# Patient Record
Sex: FEMALE | Race: WHITE | Employment: FULL TIME | URBAN - METROPOLITAN AREA
[De-identification: names, ages, dates, MRNs, and addresses within clinical notes are randomized per-mention and may not be internally consistent; named-entity substitution may affect disease eponyms.]

---

## 2023-01-13 ENCOUNTER — OFFICE VISIT (OUTPATIENT)
Dept: PRIMARY CARE CLINIC | Age: 38
End: 2023-01-13

## 2023-01-13 VITALS
OXYGEN SATURATION: 98 % | WEIGHT: 124 LBS | DIASTOLIC BLOOD PRESSURE: 68 MMHG | SYSTOLIC BLOOD PRESSURE: 106 MMHG | HEART RATE: 87 BPM

## 2023-01-13 DIAGNOSIS — R53.83 OTHER FATIGUE: ICD-10-CM

## 2023-01-13 DIAGNOSIS — Z00.00 PREVENTATIVE HEALTH CARE: Primary | ICD-10-CM

## 2023-01-13 DIAGNOSIS — R39.15 URINARY URGENCY: ICD-10-CM

## 2023-01-13 LAB
A/G RATIO: 2 (ref 1.1–2.2)
ALBUMIN SERPL-MCNC: 4.5 G/DL (ref 3.4–5)
ALP BLD-CCNC: 65 U/L (ref 40–129)
ALT SERPL-CCNC: 10 U/L (ref 10–40)
ANION GAP SERPL CALCULATED.3IONS-SCNC: 9 MMOL/L (ref 3–16)
AST SERPL-CCNC: 15 U/L (ref 15–37)
BASOPHILS ABSOLUTE: 0.1 K/UL (ref 0–0.2)
BASOPHILS RELATIVE PERCENT: 1.4 %
BILIRUB SERPL-MCNC: 0.4 MG/DL (ref 0–1)
BUN BLDV-MCNC: 16 MG/DL (ref 7–20)
CALCIUM SERPL-MCNC: 9.1 MG/DL (ref 8.3–10.6)
CHLORIDE BLD-SCNC: 103 MMOL/L (ref 99–110)
CHOLESTEROL, TOTAL: 175 MG/DL (ref 0–199)
CO2: 26 MMOL/L (ref 21–32)
CREAT SERPL-MCNC: 0.8 MG/DL (ref 0.6–1.1)
EOSINOPHILS ABSOLUTE: 0 K/UL (ref 0–0.6)
EOSINOPHILS RELATIVE PERCENT: 1 %
GFR SERPL CREATININE-BSD FRML MDRD: >60 ML/MIN/{1.73_M2}
GLUCOSE BLD-MCNC: 66 MG/DL (ref 70–99)
HCT VFR BLD CALC: 40.6 % (ref 36–48)
HDLC SERPL-MCNC: 54 MG/DL (ref 40–60)
HEMOGLOBIN: 13.5 G/DL (ref 12–16)
HEPATITIS C ANTIBODY INTERPRETATION: NORMAL
IRON SATURATION: 71 % (ref 15–50)
IRON: 189 UG/DL (ref 37–145)
LDL CHOLESTEROL CALCULATED: 108 MG/DL
LYMPHOCYTES ABSOLUTE: 1.7 K/UL (ref 1–5.1)
LYMPHOCYTES RELATIVE PERCENT: 39.4 %
MCH RBC QN AUTO: 29.4 PG (ref 26–34)
MCHC RBC AUTO-ENTMCNC: 33.1 G/DL (ref 31–36)
MCV RBC AUTO: 88.8 FL (ref 80–100)
MONOCYTES ABSOLUTE: 0.4 K/UL (ref 0–1.3)
MONOCYTES RELATIVE PERCENT: 9.7 %
NEUTROPHILS ABSOLUTE: 2.1 K/UL (ref 1.7–7.7)
NEUTROPHILS RELATIVE PERCENT: 48.5 %
PDW BLD-RTO: 12.7 % (ref 12.4–15.4)
PLATELET # BLD: 248 K/UL (ref 135–450)
PMV BLD AUTO: 8.3 FL (ref 5–10.5)
POTASSIUM SERPL-SCNC: 5 MMOL/L (ref 3.5–5.1)
RBC # BLD: 4.58 M/UL (ref 4–5.2)
SODIUM BLD-SCNC: 138 MMOL/L (ref 136–145)
TOTAL IRON BINDING CAPACITY: 267 UG/DL (ref 260–445)
TOTAL PROTEIN: 6.7 G/DL (ref 6.4–8.2)
TRIGL SERPL-MCNC: 66 MG/DL (ref 0–150)
TSH REFLEX: 1.2 UIU/ML (ref 0.27–4.2)
VITAMIN D 25-HYDROXY: 39.8 NG/ML
VLDLC SERPL CALC-MCNC: 13 MG/DL
WBC # BLD: 4.4 K/UL (ref 4–11)

## 2023-01-13 RX ORDER — CETIRIZINE HYDROCHLORIDE 10 MG/1
10 TABLET ORAL DAILY
COMMUNITY

## 2023-01-13 ASSESSMENT — PATIENT HEALTH QUESTIONNAIRE - PHQ9
SUM OF ALL RESPONSES TO PHQ QUESTIONS 1-9: 0
SUM OF ALL RESPONSES TO PHQ QUESTIONS 1-9: 0
2. FEELING DOWN, DEPRESSED OR HOPELESS: 0
SUM OF ALL RESPONSES TO PHQ9 QUESTIONS 1 & 2: 0
1. LITTLE INTEREST OR PLEASURE IN DOING THINGS: 0
SUM OF ALL RESPONSES TO PHQ QUESTIONS 1-9: 0
SUM OF ALL RESPONSES TO PHQ QUESTIONS 1-9: 0

## 2023-01-13 ASSESSMENT — ENCOUNTER SYMPTOMS
DIARRHEA: 0
COUGH: 0
WHEEZING: 0
CONSTIPATION: 0
NAUSEA: 0
BLOOD IN STOOL: 0
VOMITING: 0
SHORTNESS OF BREATH: 0

## 2023-01-13 NOTE — PROGRESS NOTES
PROGRESS NOTE  Date of Service:  1/13/2023  Address: [address] [facility]   [address], [address] [place]  Dept: [phone]  Loc: [phone]    Subjective:      Patient ID: [id number]  Clement Little is a 40 y.o. female    HPI: patient is here to establish care, patient said she is originally from [facility]. Patient works for [employer]. Patient has a 13 month old. Patient said that she is tried a lot more, she said that she used to take really care of her self, she has at 16 month at home and working full time. Patient said her daughter will wake up but she does wake up a lot through. Patient said her periods are a little heavier, she is keeping track, she said a little closer as well. Patient does have constipation recently, she said the last few months. Patient said that she only gets about 20 ounces of water a day. Patient is eating out 2-3 days a week. Review of Systems   Constitutional:  Negative for chills, fatigue and fever. Respiratory:  Negative for cough, shortness of breath and wheezing. Cardiovascular:  Negative for chest pain, palpitations and leg swelling. Gastrointestinal:  Negative for blood in stool, constipation, diarrhea, nausea and vomiting. Psychiatric/Behavioral:  Negative for self-injury, sleep disturbance and suicidal ideas. The patient is not nervous/anxious. All other systems reviewed and are negative. Objective:   Physical Exam  Vitals reviewed. Constitutional:       Appearance: Normal appearance. Cardiovascular:      Rate and Rhythm: Normal rate and regular rhythm. Pulses: Normal pulses. Heart sounds: Normal heart sounds. Pulmonary:      Effort: Pulmonary effort is normal.      Breath sounds: Normal breath sounds. Skin:     Capillary Refill: Capillary refill takes less than 2 seconds. Neurological:      General: No focal deficit present.       Mental Status: She is alert and oriented to person, place, and time. Psychiatric:         Mood and Affect: Mood normal.         Behavior: Behavior normal.         Thought Content: Thought content normal.         Judgment: Judgment normal.       Plan:   1. Preventative health care patient is doing well overall she has been more fatigued recently but she is feels this could be related to her lifestyle changes since she has had her 12month-old. We will get blood work to rule out underlying causes of fatigue patient agrees. Encourage patient to get back to exercising and yoga and increasing her water intake patient agrees. -     TSH with Reflex  -     CBC with Auto Differential  -     Iron and TIBC  -     Comprehensive Metabolic Panel  -     Hepatitis C Antibody  -     Lipid Panel  -     Vitamin D 25 Hydroxy  2. 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 Encourage patient to make lifestyle changes to improve overall health. Gave patient information about balance café. -     TSH with Reflex  -     CBC with Auto Differential  3. Urinary urgency patient was seeing a pelvic physical therapist in [facility] will refer today. -     [facility]           Electronically signed by ERICA Estrada CNP on 1/13/23 at 9:56 AM EST     This dictation was generated by voice recognition computer software. Although all attempts are made to edit the dictation for accuracy, there may be errors in the transcription that were not intended.

## 2023-02-08 ENCOUNTER — HOSPITAL ENCOUNTER (OUTPATIENT)
Dept: PHYSICAL THERAPY | Age: 38
Setting detail: THERAPIES SERIES
Discharge: HOME | End: 2023-02-08
Payer: COMMERCIAL

## 2023-02-08 LAB — PAP SMEAR, EXTERNAL: NORMAL

## 2023-02-08 PROCEDURE — 97161 PT EVAL LOW COMPLEX 20 MIN: CPT

## 2023-02-08 PROCEDURE — 97530 THERAPEUTIC ACTIVITIES: CPT

## 2023-02-08 PROCEDURE — 97140 MANUAL THERAPY 1/> REGIONS: CPT

## 2023-02-08 PROCEDURE — 97110 THERAPEUTIC EXERCISES: CPT

## 2023-02-08 NOTE — FLOWSHEET NOTE
[facility] Outpatient Therapy  [address], [address], [address]  Phone: [phone]   Fax: [phone]    Physical Therapy Treatment Note/ Progress Report:     Date:  2023     Patient Name:  Artis Goodman    :  1985  MRN: [id number]    Medical Diagnosis Information:  Urinary urgency [R39.15]  Treatment Diagnosis Information:  Treatment Diagnosis: R39.15 Urinary urgency, N39.3 stress incontinence  Insurance/Certification information:  PT Insurance Information: [facility]  Physician Information:  Mary Meyer, *   Plan of care signed:    [] Yes  [x] No    Date of Patient follow up with Physician:      Progress Report: []  Yes  [x]  No   If Yes, Date Range for reporting period:  Beginnin23  Ending:      Progress report due (10 Rx/or 30 days whichever is less):      Recertification due (POC duration/ or 90 days whichever is less):      Visit # Insurance Allowable Auth Needed     []Yes   []No     RESTRICTIONS/PRECAUTIONS: [place]  Latex Allergy:  [x]NO      []YES  Preferred Language for Healthcare:   [x]English       []other:    Functional Scale: PFDI-20   Score: 34/300 (11%)  Date assessed: 23    Pain level:  0/10     SUBJECTIVE:  See eval    OBJECTIVE: See eval  Observation: scoliosis R hip high/R shoulder low  Test measurements:      Exercises/Interventions: Exercises in bold performed in department today. Items not bolded are carried forward from prior visits for continuity of the record.     Exercise/Equipment Resistance/Repetitions HEP Other comments     []    PERFECT/R OI and LA release manual []      []    PF short holds 6-8 reps x 10 []    \"The knack\" anticipatory PF contraction w/cough x3 []      []      []      []      []      []      []      []      []      []      []      []      []      []      Home Exercise Program:      Therapeutic Exercise: per list x 14 min  [x] (25147) Provided verbal/tactile cueing for activities related to strengthening, flexibility, endurance, ROM for improvements in LE, proximal hip, pelvic floor, and core control with self-care, mobility, lifting, ambulation. Per list x 15 min    NMR:  [] (66554) Provided verbal/tactile cueing for activities related to improving balance, coordination, kinesthetic sense, posture, motor skill, proprioception to assist with LE, proximal hip, pelvic floor, and core control in self-care, mobility, lifting, ambulation and eccentric single leg control. Therapeutic Activities:  x 21 min  [x] (23130) Provided verbal/tactile cueing for activities related to improving balance, coordination, kinesthetic sense, posture, motor skill, proprioception and motor activation to allow for proper function of core, proximal hip, pelvic floor, and LE with self-care and ADLs and functional mobility. Other Treatment:   TA:  Bladder re-training/education:    Bladder Diary: patient educated on importance of filling out bladder diary at home, complete with fluid intake, voids, and leakage when applicable. Voiding: patient educated on normal voiding and urinary cycle and the physiology of bladder control muscles and pelvic floor. The patient was educated on bladder dysfunction as well as MILADY with urethral involvement. The patient was also educated on prolapse and it's affect on urination and pain. Dietary: patient educated on the \"4Cs\" to reduce bladder irritation and leakage. Other: education on fluid intake, high fiber foods and recommendations, squatty potty and avoidance of JIC voids and effects on bladder function.   Gait Training:    [] (21083) Provided training and instruction to the patient for proper LE, core and proximal hip recruitment and positioning and eccentric body weight control with ambulation re-education including up and down stairs     Manual Treatments:    [x] (34996) Provided manual therapy to mobilize soft tissue/joints for the purpose of modulating pain, promoting relaxation,  increasing ROM, reducing/eliminating soft tissue swelling/inflammation/restriction, improving soft tissue extensibility and allowing for proper ROM for normal function with self-care, mobility, lifting and ambulation. TERRY, R OI and LA MFR x 18 min    Home Exercise Program:    [x] (77924) Reviewed/Progressed HEP activities related to strengthening, flexibility, endurance, ROM of core, proximal hip, pelvic floor, and LE for functional self-care, mobility, lifting and ambulation/stair navigation   [] (49690) Reviewed/Progressed HEP activities related to improving balance, coordination, kinesthetic sense, posture, motor skill, proprioception of core, proximal hip pelvic floor, and LE for self-care, mobility, lifting, and ambulation/stair navigation      Modalities:    [] Electric Stimulation:   [] Ultrasound:   [] Other:       Charges:  Timed Code Treatment Minutes: Eval x 20, MT 14, TA x 19, TE x 14   Total Treatment Minutes: 67 min      [x] EVAL (LOW) 14862 (typically 20 minutes face-to-face)  [] EVAL (MOD) 09558 (typically 30 minutes face-to-face)  [] EVAL (HIGH) 58782 (typically 45 minutes face-to-face)  [] RE-EVAL     [x] HW(26818) x 1      [] NMR (68337) x       [x] Manual (52479) x 1      [x] TA (39897) x  1     [] Gait Training [phone] x       [] ES(attended) (20815)  [] ES (un) (54154)   [] DRY NEEDLE 1 OR 2 MUSCLES  [] DRY NEEDLE 3+ MUSCLES  [] Mech Traction (32921)  [] Ultrasound (49218)  [] Other:      GOALS:  Patient stated goal: \"able to hold urine\" without leaking  [] Progressing: [] Met: [] Not Met: [] Adjusted        Therapist goals for Patient:   Short Term Goals: To be achieved in: 6 weeks  1. Pt will report improvement in urinary urgency by 75%   [] Progressing: [] Met: [] Not Met: [] Adjusted  2.  Patient will report decreased JIC voids by 80% and verbalize 4/4 urge suppression/bladder retraining techniques   [] Progressing: [] Met: [] Not Met: [] Adjusted     Long Term Goals: To be achieved in: 12 weeks  1. Disability index score of 5% or less for the PFDI-20 to assist with reaching prior level of function. [] Progressing: [] Met: [] Not Met: [] Adjusted  2. Patient will demonstrate good core stability and pelvic bracing with exertion to prevent daily urinary leakage   [] Progressing: [] Met: [] Not Met: [] Adjusted  3. Patient will increase PERF score to 4/10/10/10 to demonstrate increased strength and coordination over pelvic floor musculature. [] Progressing: [] Met: [] Not Met: [] Adjusted  4. Patient will report 1 week or greater without leakage to progress towards completing ADLs and recreational activities without leakage. [] Progressing: [] Met: [] Not Met: [] Adjusted  5. Patient will demonstrate good load transfer,breath control, and body mechanics with exertion/lifting to prevent leakage  [] Progressing: [] Met: [] Not Met: [] Adjusted  6. Pt will resume recreational activities without leakage    [] Progressing: [] Met: [] Not Met: [] Adjusted         ASSESSMENT:  44yo female pt referred to PFPT due to urinary urgency and stress incontinence. Pt presents with I,non antalgic gait. R hip is high and R shoulder is low in standing. Pt demonstrates limitations in lumbar side bending to the R 25%. L proximal hip muscular is weak and R is tight in comparison. Pt demonstrates decreased stabilization on the L LE with SLS. Mild diastasis is present and activation of TrA is fair. Rectus Abdominus and obliques are good. Internal assessment revealed decreased pelvic floor strength, endurance and overall coordination with PERF score 2/7/6/6. Pt also demonstrated palpable tenderness at R OI and LA, with no palpable contraction with L OI activation. This patient will benefit from continued skilled PT to restore daily activity and exertion without urinary leakage.          Treatment/Activity Tolerance:  [x] Patient tolerated treatment well [] Patient limited by oswaldo  [] Patient limited by pain  [] Patient limited by other medical complications  [] Other:     Overall Progression Towards Functional goals/ Treatment Progress Update:  [] Patient is progressing as expected towards functional goals listed. [] Progression is slowed due to complexities/Impairments listed. [] Progression has been slowed due to co-morbidities. [x] Plan just implemented, too soon to assess goals progression <30days   [] Goals require adjustment due to lack of progress  [] Patient is not progressing as expected and requires additional follow up with physician  [] Other    Prognosis for POC: [x] Good [] Fair  [] Poor    Patient requires continued skilled intervention: [x] Yes  [] No        PLAN: See eval  [] Continue per plan of care [] Alter current plan (see comments)  [x] Plan of care initiated [] Hold pending MD visit [] Discharge    Electronically signed by: Roland Lozano PT    Note: If patient does not return for scheduled/recommended follow up visits, this note will serve as a discharge from care along with the most recent update on progress.

## 2023-02-08 NOTE — PLAN OF CARE
[facility] Outpatient Therapy  [address] [place] [address], CL[address], [address]  Phone: [phone]   Fax: [phone]                                                       Physical Therapy Certification    Dear ERICA Naqvi CNP,    We had the pleasure of evaluating the following patient for physical therapy services at [address] [place] [address]. A summary of our findings can be found in the initial assessment below. This includes our plan of care. If you have any questions or concerns regarding these findings, please do not hesitate to contact me at the office phone number checked above. Thank you for the referral.       Physician Signature:_______________________________Date:__________________  By signing above (or electronic signature), therapist's plan is approved by physician      Patient: Lois Mills   : 1985   MRN: [id number]  Referring Physician: ERICA Naqvi CNP      Evaluation Date: 2023      Medical Diagnosis Information:  Diagnosis: R 39.15 Urinary Urgency   Treatment diagnosis:  Treatment Diagnosis: R39.15 Urinary urgency, N39.3 stress incontinence                                            Insurance information: PT Insurance Information: [facility]    Chaperone offered for pelvic examination? [] No    [x] Yes  Chaperone requested for examination:  [x] No    [] Yes   If yes, who was present:    Precautions/ Contra-indications: Universal    Latex Allergy:  [x]NO      []YES  Allergies: Patient has no known allergies. Preferred Language for Healthcare:   [x]English       []Other:    C-SSRS Triggered by Intake questionnaire (Past 2 wk assessment ):   [x] No, Questionnaire did not trigger screening.   [] Yes, Patient intake triggered C-SSRS Screening     [] Completed, no further action required.    [] Completed, PCP notified via Epic    Functional Outcome:   PFDI-20: 23: 34/300 (11%)       SUBJECTIVE: Patient reports requesting PFPT due to ongoing issues with urinary urgency and stress incontinence. Pt recently moved to [facility] from [facility] where she was receiving PFPT following the birth of her child 16 months ago due to a grade III tear during delivery. Pt reports high stress levels due to working FT and taking care of 13 month old. Reports hx of constipation which has been resolved with going off prenatal vitamins last month. Pt denies pain but c/o general weakness and fatigue with desire to get back to recreational activities without fear of leakage. Pt goal for therapy is to \"be able to hold urine with cough/sneezing and recreational activity\"    No history of sexual abuse or trauma. Urinary frequency? Daytime? Yes,depending on day. Reports 5 JIC voids and sometimes goes 5 hours b/n voids. Nighttime? 0-1  Bowel problems? Constipation resolved  Urinary or bowel leakage? Urine 0-1x/wk, quarter size unless sneezing attack occurs  Leakage frequency?weekly  Protection:    Type: NA   #changes/day:  Pregnancy:   # of pregnancies:1   # of deliveries:1   Episiotomy: grade 3 tear   Normal post- healing? yes  Are you having regular menstrual cycles? yes  Date of last pap smear? Up to date       4 week or greater of failed trial of PFPT program?   [x] No    [] Yes    PFPT program as defined by \"Completing 4 weeks of an ordered plan of pelvic muscle exercises designed to increase periurethral muscle strength\".     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[employer], travels in car a lot      Living Status/Prior Level of Function: Prior to this giving birth 16 mo ago, pt was I with exertion,coughing,sneezing without incontinence    OBJECTIVE:    Observation:   Posture: R hip high, R shoulder low   Gait analysis: WFL  [facility]: [facility]  Scar Location/Mobility:  Diastasis Recti (in finger width): IGFEX:2:3  At umbilicus:1:2 PMAXA:7:3  Abdominal strength: TrA: underactive, RF: good, Obliques: good      Special Tests:  Sacroiliac Test:   Gillet: positive (R)  Gaenslen: negative    OSIEL: (-)   Thigh Thrust (-)   ASIS Distraction(-)   Sacral Compression (-)  Lumbar Spine:   Slump test: negative (B)   SLR : (-) B              ROM: RSB limited 25%  Hips: ASSLR: (+) decreased load transfer             ROM:WNL throughout             Strength: L flex: 4-, ext 4-, abd 4-, ER 3+, IR 3+  SLS: 20 sec B (difficulty on L)    Neuro:   Sensation: (B) UEs:intact to light touch   Sensation: (B) LEs: intact to light touch   Anal Sensation:    S5: intact to light touch    S4: intact to light touch    S3:intact to light touch   Reflexes:     Anal: present    Cough: present      Pelvic Floor Exam  External:  Skin Condition: normal  Sensation: intact   Palpation: no point tenderness noted  Tone: normal    Perineal Body Mobility:    Voluntary PFM Contraction: present (normal)   Voluntary PFM Relaxation: present ( delayed)   Involuntary PFM Contraction/Cough Reflex: present (bulge)   Involuntary PFM Relaxation: present (normal)  Perineal Body Descent:   Rest:  supported   Bearing down: present (caudal)    Q-tip test: NT   (R) UQ   (L) UQ   (R) LQ   (L) LQ   Post. Fourchette:    Internal:  Sensation: intact    Palpation: mild tenderness R OI and LA, L posterior fourchette   Vaginal Vault Size: decreased  PERFECT:   Power: 2/5 (good squeeze, no lift)   Endurance: 7 sec. Repetitions: 6   Fast Twitch: 6   Elevation:    Co-contraction: weak   Timing: decreased  OI strength: no palpable contraction on L  Pelvic Organ Prolapse:   Type: anterior   Grade: 2                         [x] Patient history, allergies, meds reviewed. Medical chart reviewed. See intake form.      Review Of Systems (ROS):  [x]Performed Review of systems (Integumentary, CardioPulmonary, Neurological) by intake and observation. Intake form has been scanned into medical record. Patient has been instructed to contact their primary care physician regarding ROS issues if not already being addressed at this time.       Co-morbidities/Complexities (which will affect course of rehabilitation):   []None        []Hx of COVID   Arthritic conditions   []Rheumatoid arthritis (M05.9)  []Osteoarthritis (M19.91)  []Gout   Cardiovascular conditions   []Hypertension (I10)  []Hyperlipidemia (E78.5)  []Angina pectoris (I20)  []Atherosclerosis (I70)  []Pacemaker  []Hx of CABG/stent/  cardiac surgeries   Musculoskeletal conditions   []Disc pathology   []Congenital spine pathologies   []Osteoporosis (M81.8)  []Osteopenia (M85.8)  [x]Scoliosis       Endocrine conditions   []Hypothyroid (E03.9)  []Hyperthyroid Gastrointestinal conditions   []Constipation (N08.29)   Metabolic conditions   []Morbid obesity (E66.01)  []Diabetes type 1(E10.65) or 2 (E11.65)   []Neuropathy (G60.9)     Cardio/Pulmonary conditions   []Asthma (J45)  []Coughing   []COPD (J44.9)  []CHF  []A-fib   Psychological Disorders  [x]Anxiety (F41.9)  []Depression (F32.9)   []Other:   Developmental Disorders  []Autism (F84.0)  []CP (G80)  []Down Syndrome (Q90.9)  []Developmental delay     Neurological conditions  []Prior Stroke (I69.30)  []Parkinson's (G20)  []Encephalopathy (G93.40)  []MS (G35)  []Post-polio (G14)  []SCI  []TBI  []ALS Other conditions  []Fibromyalgia (M79.7)  []Vertigo  []Syncope  []Kidney Failure  []Cancer      []currently undergoing                treatment  [x]Pregnancy  [x]Incontinence   Prior surgeries  []involved limb  []previous spinal surgery  [] section birth  []hysterectomy  []bowel / bladder surgery  []other relevant surgeries   []Other:              Barriers to/and or personal factors that will affect rehab potential:              []Age  []Sex   []Smoker              []Motivation/Lack of Motivation                        [x]Co-Morbidities []Cognitive Function, education/learning barriers              []Environmental, home barriers              []profession/work barriers  []past PT/medical experience  []other:      Falls Risk Assessment (30 days):   [x] Falls Risk assessed and no intervention required. [] Falls Risk assessed and Patient requires intervention due to being higher risk   TUG score (>12s at risk):     [] Falls education provided, including         ASSESSMENT: 44yo female pt referred to PFPT due to urinary urgency and stress incontinence. Pt presents with I,non antalgic gait. R hip is high and R shoulder is low in standing. Pt demonstrates limitations in lumbar side bending to the R 25%. L proximal hip muscular is weak and R is tight in comparison. Pt demonstrates decreased stabilization on the L LE with SLS. Mild diastasis is present and activation of TrA is fair. Rectus Abdominus and obliques are good. Internal assessment revealed decreased pelvic floor strength, endurance and overall coordination with PERF score 2/7/6/6. Pt also demonstrated palpable tenderness at R OI and LA, with no palpable contraction with L OI activation. This patient will benefit from continued skilled PT to restore daily activity and exertion without urinary leakage.     Functional Impairments:    []Noted lumbar/proximal hip hypomobility  []Noted lumbosacral and/or generalized hypermobility  [x]Decreased core/proximal hip strength and neuromuscular control   [x]Decreased LE functional strength  [x]pelvic/sacral/spinal malalignment   []Increased pain with penetration  [x]Absent/poor control of PF contraction   [x]Absent/poor control of PF relaxation  [x]Decreased control of bladder  []Decreased control of bowel    Functional Activity Limitations (from functional questionnaire and intake)  [x]Reduced ability to maintain good posture and demonstrate good body mechanics with sitting, bending, and lifting  []Reduced ability to perform lifting, reaching, carrying tasks  [x]Reduced ability to control urine  []Reduced ability to control bowel movements   []Reduced ability to ambulate prolonged functional periods/distances/surfaces  []Reduced ability to squat   []Reduced ability to forward bend  []Reduced ability to ascend/descend stairs  []Reduced ability to tolerate penetration/intercourse    Participation Restrictions  []Reduced participation in self care activities  []Reduced participation in home management activities  []Reduced participation in work activities  []Reduced participation in social activities  [x]Reduced participation in sport/recreational activities    Classification/Subgrouping:  []signs/symptoms consistent vaginismus/dyspareunia    []signs/symptoms consistent with pelvic floor organ prolapse  [x]signs/symptoms consistent with stress urinary incontinence  [x]signs/symptoms consistent with urge urinary incontinence  []signs/symptoms consistent with bowel incontinence  []signs/symptoms consistent with post-surgical status including decreased ROM, strength and function  []signs/symptoms consistent with other:       Prognosis/Rehab Potential:      []Excellent   [x]Good    []Fair   []Poor    Tolerance of evaluation/treatment:    []Excellent   [x]Good    []Fair   []Poor    Physical Therapy Evaluation Complexity Justification  [x] A history of present problem with:  [] no personal factors and/or comorbidities that impact the plan of care;  [x]1-2 personal factors and/or comorbidities that impact the plan of care  []3 personal factors and/or comorbidities that impact the plan of care  [x] An examination of body systems using standardized tests and measures addressing any of the following: body structures and functions (impairments), activity limitations, and/or participation restrictions;:  [x] a total of 1-2 or more elements   [] a total of 3 or more elements   [] a total of 4 or more elements   [x] A clinical presentation with:  [] stable and/or uncomplicated characteristics   [] evolving clinical presentation with changing characteristics  [] unstable and unpredictable characteristics;   [x] Clinical decision making of [x] low, [] moderate, [] high complexity using standardized patient assessment instrument and/or measurable assessment of functional outcome. [x] EVAL (LOW) 35549 (typically 20 minutes face-to-face)  [] EVAL (MOD) 35874 (typically 30 minutes face-to-face)  [] EVAL (HIGH) 00445 (typically 45 minutes face-to-face)  [] RE-EVAL       PLAN:   Frequency/Duration:  1-2 days per week for 12 Weeks:  Interventions:  [x]  Therapeutic exercise including: strength training, ROM, and functional mobility for joint, spine, core, and pelvic floor   [x]  NMR activation and proprioception for abdominals, pelvic floor musculature activation and coordination, and posture retraining   [x]  Manual therapy as indicated for spine, ribs, soft tissue, and pelvic floor to include: IASTM with or without dilator, STM, PROM, Gr I-IV mobilizations   [x] Modalities as needed that may include: E-stim, Biofeedback as indicated  [x] Patient education on pelvic floor anatomy and function, bladder and bowel anatomy and function, joint protection, postural re-education, activity modification, progression of HEP. HEP instruction: Written HEP instructions provided and reviewed    GOALS:  Patient stated goal: \"able to hold urine\" without leaking  [] Progressing: [] Met: [] Not Met: [] Adjusted      Therapist goals for Patient:   Short Term Goals: To be achieved in: 6 weeks  1. Pt will report improvement in urinary urgency by 75%   [] Progressing: [] Met: [] Not Met: [] Adjusted  2. Patient will report decreased JIC voids by 80% and verbalize 4/4 urge suppression/bladder retraining techniques   [] Progressing: [] Met: [] Not Met: [] Adjusted    Long Term Goals: To be achieved in: 12 weeks  1.  Disability index score of 5% or less for the PFDI-20 to assist with reaching prior level of function. [] Progressing: [] Met: [] Not Met: [] Adjusted  2. Patient will demonstrate good core stability and pelvic bracing with exertion to prevent daily urinary leakage   [] Progressing: [] Met: [] Not Met: [] Adjusted  3. Patient will increase PERF score to 4/10/10/10 to demonstrate increased strength and coordination over pelvic floor musculature. [] Progressing: [] Met: [] Not Met: [] Adjusted  4. Patient will report 1 week or greater without leakage to progress towards completing ADLs and recreational activities without leakage. [] Progressing: [] Met: [] Not Met: [] Adjusted  5. Patient will demonstrate good load transfer,breath control, and body mechanics with exertion/lifting to prevent leakage  [] Progressing: [] Met: [] Not Met: [] Adjusted  6.  Pt will resume recreational activities without leakage    [] Progressing: [] Met: [] Not Met: [] Adjusted     Electronically signed by:  Bibiana Khan, PT

## 2023-02-20 ENCOUNTER — HOSPITAL ENCOUNTER (OUTPATIENT)
Dept: PHYSICAL THERAPY | Age: 38
Setting detail: THERAPIES SERIES
Discharge: HOME | End: 2023-02-20
Payer: COMMERCIAL

## 2023-02-20 PROCEDURE — 97530 THERAPEUTIC ACTIVITIES: CPT

## 2023-02-20 PROCEDURE — 97110 THERAPEUTIC EXERCISES: CPT

## 2023-02-20 NOTE — FLOWSHEET NOTE
[facility] Outpatient Therapy  [address] [place] [address] [address], [address]  Phone: [phone]   Fax: [phone]    Physical Therapy Treatment Note/ Progress Report:     Date:  2023     Patient Name:  Cheyanne Howe    :  1985  MRN: [id number]    Medical Diagnosis Information:  Urinary urgency [R39.15]  Treatment Diagnosis Information:  Treatment Diagnosis: R39.15 Urinary urgency, N39.3 stress incontinence  Insurance/Certification information:  PT Insurance Information: [facility]  Physician Information:  Barber Pablo, *   Plan of care signed:    [] Yes  [x] No    Date of Patient follow up with Physician:      Progress Report: []  Yes  [x]  No   If Yes, Date Range for reporting period:  Beginnin23  Ending:      Progress report due (10 Rx/or 30 days whichever is less): 72     Recertification due (POC duration/ or 90 days whichever is less):      Visit # Insurance Allowable Auth Needed     []Yes   []No     RESTRICTIONS/PRECAUTIONS: [place]  Latex Allergy:  [x]NO      []YES  Preferred Language for Healthcare:   [x]English       []other:    Functional Scale: PFDI-20   Score: 34/300 (11%)  Date assessed: 23    Pain level:  0/10     SUBJECTIVE: denies any leakage since last seen, had something stuck in through and trying to clear throat/coughing with no leakage, has been practicing the anticipatory PF contraction as instructed last visit    OBJECTIVE: See eval  Observation: scoliosis R hip high/R shoulder low  Test measurements:      Exercises/Interventions: Exercises in bold performed in department today. Items not bolded are carried forward from prior visits for continuity of the record.     Exercise/Equipment Resistance/Repetitions HEP Other comments     []     []    Bladder diary instruction []      []    \"The knack\" anticipatory PF contraction w/cough X 3 []      []    TrA + heel slide 3x10 []    Abdominal crunch 3x10 []    obliques 3x10 [] []    Clamshells + TrA Green tband x 30 []    SL abduction + TrA x30 []      []    PF short holds 6-8 sets x 10 []    PF  long holds  7 sec holds x 7 [] Cues for breathing   PF + add x10 []      []      []      Home Exercise Program:      Therapeutic Exercise:   [x] (97206) Provided verbal/tactile cueing for activities related to strengthening, flexibility, endurance, ROM for improvements in LE, proximal hip, pelvic floor, and core control with self-care, mobility, lifting, ambulation. Per list x 15 min    NMR:  [] (12663) Provided verbal/tactile cueing for activities related to improving balance, coordination, kinesthetic sense, posture, motor skill, proprioception to assist with LE, proximal hip, pelvic floor, and core control in self-care, mobility, lifting, ambulation and eccentric single leg control. Therapeutic Activities:    [] (43966) Provided verbal/tactile cueing for activities related to improving balance, coordination, kinesthetic sense, posture, motor skill, proprioception and motor activation to allow for proper function of core, proximal hip, pelvic floor, and LE with self-care and ADLs and functional mobility. Other Treatment:   TA:  Bladder re-training/education:    Bladder Diary: patient educated on importance of filling out bladder diary at home, complete with fluid intake, voids, and leakage when applicable. Voiding: patient educated on normal voiding and urinary cycle and the physiology of bladder control muscles and pelvic floor. The patient was educated on bladder dysfunction as well as MILADY with urethral involvement. The patient was also educated on prolapse and it's affect on urination and pain. Dietary: patient educated on the \"4Cs\" to reduce bladder irritation and leakage. Other: education on fluid intake, high fiber foods and recommendations, squatty potty and avoidance of JIC voids and effects on bladder function.     Gait Training:    [] (48285) Provided training and instruction to the patient for proper LE, core and proximal hip recruitment and positioning and eccentric body weight control with ambulation re-education including up and down stairs     Manual Treatments:    [] (29991) Provided manual therapy to mobilize soft tissue/joints for the purpose of modulating pain, promoting relaxation,  increasing ROM, reducing/eliminating soft tissue swelling/inflammation/restriction, improving soft tissue extensibility and allowing for proper ROM for normal function with self-care, mobility, lifting and ambulation. Home Exercise Program:    [] (93810) Reviewed/Progressed HEP activities related to strengthening, flexibility, endurance, ROM of core, proximal hip, pelvic floor, and LE for functional self-care, mobility, lifting and ambulation/stair navigation   [] (58838) Reviewed/Progressed HEP activities related to improving balance, coordination, kinesthetic sense, posture, motor skill, proprioception of core, proximal hip pelvic floor, and LE for self-care, mobility, lifting, and ambulation/stair navigation      Modalities:    [] Electric Stimulation:   [] Ultrasound:   [] Other:       Charges:  Timed Code Treatment Minutes: TE x 33, TA x 13   Total Treatment Minutes: 46 min      [] EVAL (LOW) 18148 (typically 20 minutes face-to-face)  [] EVAL (MOD) 70292 (typically 30 minutes face-to-face)  [] EVAL (HIGH) 59987 (typically 45 minutes face-to-face)  [] RE-EVAL     [x] KS(34405) x 2      [] NMR (68860) x       [] Manual (98887) x       [x] TA (80924) x  1     [] Gait Training [phone]) x       [] ES(attended) (83387)  [] ES (un) (96 [phone])   [] DRY NEEDLE 1 OR 2 MUSCLES  [] DRY NEEDLE 3+ MUSCLES  [] Mech Traction (80739)  [] Ultrasound (96558)  [] Other:      GOALS:  Patient stated goal: \"able to hold urine\" without leaking  [] Progressing: [] Met: [] Not Met: [] Adjusted        Therapist goals for Patient:   Short Term Goals: To be achieved in: 6 weeks  1.  Pt will report improvement in urinary urgency by 75%   [] Progressing: [] Met: [] Not Met: [] Adjusted  2. Patient will report decreased JIC voids by 80% and verbalize 4/4 urge suppression/bladder retraining techniques   [] Progressing: [] Met: [] Not Met: [] Adjusted     Long Term Goals: To be achieved in: 12 weeks  1. Disability index score of 5% or less for the PFDI-20 to assist with reaching prior level of function. [] Progressing: [] Met: [] Not Met: [] Adjusted  2. Patient will demonstrate good core stability and pelvic bracing with exertion to prevent daily urinary leakage   [] Progressing: [] Met: [] Not Met: [] Adjusted  3. Patient will increase PERF score to 4/10/10/10 to demonstrate increased strength and coordination over pelvic floor musculature. [] Progressing: [] Met: [] Not Met: [] Adjusted  4. Patient will report 1 week or greater without leakage to progress towards completing ADLs and recreational activities without leakage. [] Progressing: [] Met: [] Not Met: [] Adjusted  5. Patient will demonstrate good load transfer,breath control, and body mechanics with exertion/lifting to prevent leakage  [] Progressing: [] Met: [] Not Met: [] Adjusted  6. Pt will resume recreational activities without leakage    [] Progressing: [] Met: [] Not Met: [] Adjusted         ASSESSMENT:  44yo female pt referred to PFPT due to urinary urgency and stress incontinence. Pt tolerated initiation of core and PF strengthening well. Demonstrated difficulty holding PF x 7 seconds using breath holding with continued training required. Pt instructed in bladder diary for review next visit. Overall tolerating therapy well with improvement with leakage reported already. .This patient will benefit from continued skilled PT to restore daily activity and exertion without urinary leakage.          Treatment/Activity Tolerance:  [x] Patient tolerated treatment well [] Patient limited by fatique  [] Patient limited by pain  [] Patient limited by other medical complications  [] Other:     Overall Progression Towards Functional goals/ Treatment Progress Update:  [x] Patient is progressing as expected towards functional goals listed. [] Progression is slowed due to complexities/Impairments listed. [] Progression has been slowed due to co-morbidities. [] Plan just implemented, too soon to assess goals progression <30days   [] Goals require adjustment due to lack of progress  [] Patient is not progressing as expected and requires additional follow up with physician  [] Other    Prognosis for POC: [x] Good [] Fair  [] Poor    Patient requires continued skilled intervention: [x] Yes  [] No        PLAN: See eval  [] Continue per plan of care [] Alter current plan (see comments)  [x] Plan of care initiated [] Hold pending MD visit [] Discharge    Electronically signed by: Ely Foote PT    Note: If patient does not return for scheduled/recommended follow up visits, this note will serve as a discharge from care along with the most recent update on progress.

## 2023-03-01 ENCOUNTER — HOSPITAL ENCOUNTER (OUTPATIENT)
Dept: PHYSICAL THERAPY | Age: 38
Setting detail: THERAPIES SERIES
Discharge: HOME | End: 2023-03-01
Payer: COMMERCIAL

## 2023-03-01 PROCEDURE — 97140 MANUAL THERAPY 1/> REGIONS: CPT

## 2023-03-01 PROCEDURE — 97110 THERAPEUTIC EXERCISES: CPT

## 2023-03-01 PROCEDURE — 97530 THERAPEUTIC ACTIVITIES: CPT

## 2023-03-01 NOTE — FLOWSHEET NOTE
[facility] Outpatient Therapy  [facility] [address] [place] [address],  Damien Mercer 51, [address]  Phone: [phone]   Fax: [phone]    Physical Therapy Treatment Note/ Progress Report:     Date:  2023     Patient Name:  Anni Meade    :  1985  MRN: [id number]    Medical Diagnosis Information:  Urinary urgency [R39.15]  Treatment Diagnosis Information:  Treatment Diagnosis: R39.15 Urinary urgency, N39.3 stress incontinence  Insurance/Certification information:  PT Insurance Information: [facility]  Physician Information:  Anil Deal, *   Plan of care signed:    [x] Yes  [] No    Date of Patient follow up with Physician:      Progress Report: []  Yes  [x]  No   If Yes, Date Range for reporting period:  Beginnin23  Ending:      Progress report due (10 Rx/or 30 days whichever is less): 0/3/49     Recertification due (POC duration/ or 90 days whichever is less):      Visit # Insurance Allowable Auth Needed   3/24  []Yes   []No     RESTRICTIONS/PRECAUTIONS: [place]  Latex Allergy:  [x]NO      []YES  Preferred Language for Healthcare:   [x]English       []other:    Functional Scale: PFDI-20   Score: 34/300 (11%)  Date assessed: 23    Pain level:  2/10 low back     SUBJECTIVE: reports having the flu over the past few days with complete loss of urine when vomiting    OBJECTIVE: See eval  Observation: scoliosis :R hip high/R shoulder low  Test measurements:  RSB decreased 25%,  Mild palpable tenderness R OI and LA  L hip strength 4- except rotators 3+  No palpable contraction L OI  PERFECT: /6/6, decreased coordination,timing and minimal co-contraction of TrA    Exercises/Interventions: Exercises in bold performed in department today. Items not bolded are carried forward from prior visits for continuity of the record.     Exercise/Equipment Resistance/Repetitions HEP Other comments   ITB,piriformis,hamstring stretch R LE manual []    R long leg pulls 0/45, lateral hip distraction,B long leg distraction manual []    Bladder diary Reviewed results []      []    \"The knack\" anticipatory PF contraction w/cough X 3 []    Bridge + PF x10 []    TrA + heel slide 3x10 []    Abdominal crunch 3x10 []    obliques 3x10 []      []    Clamshells + TrA Green tband x 30 []    SL abduction + TrA x30 []      []    PF short holds 6-8 sets x 10 []    PF  long holds  7 sec holds x 7 [] Cues for breathing   PF + add x10 []    PF + abd Green tband x 10 []      []      Home Exercise Program:  Access Code: [email or web address]  URL: [email or web address]  Date: 03/01/2023  Prepared by: America Hinton    Exercises  Standing ITB Stretch - 1 x daily - 7 x weekly - 1 sets - 3 reps - 30 hold  Seated Pelvic Floor Contraction - 1 x daily - 7 x weekly - 6-8 sets - 10 reps - 1 hold  Seated Pelvic Floor Contraction - 1 x daily - 7 x weekly - 1 sets - 10 reps - 7 hold  Seated Pelvic Floor Contraction with Hip Abduction and Resistance Loop - 1 x daily - 7 x weekly - 3 sets - 10 reps  Seated Pelvic Floor Contraction with Isometric Hip Adduction - 1 x daily - 7 x weekly - 3 sets - 10 reps  Seated Cough with Pelvic Floor Contraction and Hand to Mouth - 1 x daily - 7 x weekly - 1 sets - 5 reps  Supine Bridge with Pelvic Floor Contraction - 1 x daily - 7 x weekly - 3 sets - 10 reps  Clamshell with Resistance - 1 x daily - 7 x weekly - 3 sets - 10 reps  Sidelying Hip Abduction - 1 x daily - 7 x weekly - 3 sets - 10 reps      Therapeutic Exercise:   [x] (86293) Provided verbal/tactile cueing for activities related to strengthening, flexibility, endurance, ROM for improvements in LE, proximal hip, pelvic floor, and core control with self-care, mobility, lifting, ambulation.  Per list x 17 min    NMR:  [] (72195) Provided verbal/tactile cueing for activities related to improving balance, coordination, kinesthetic sense, posture, motor skill, proprioception to assist with LE, proximal hip, pelvic floor, and core control in self-care, mobility, lifting, ambulation and eccentric single leg control. Therapeutic Activities:    [] (11890) Provided verbal/tactile cueing for activities related to improving balance, coordination, kinesthetic sense, posture, motor skill, proprioception and motor activation to allow for proper function of core, proximal hip, pelvic floor, and LE with self-care and ADLs and functional mobility. Other Treatment: x 15 min   TA:  Bladder re-training/education:    Bladder Diary: patient educated on importance of filling out bladder diary at home, complete with fluid intake, voids, and leakage when applicable. Voiding: patient voiding 7x/day, 2-4 hour duration, no leaks, \"normal\" urge, 1 JIC void, 1 night time void    Dietary: fluid intake 56oz- 90oz per day with half being water, low fiber intake with eduction provided on fiber,constipation, and relationship to PF and prolapse    Other: bowel noted to be 2-3 on [place] scale, instructed in behavioral strategies: quick flicks and perineal pressure/sitting to decrease urgency    Gait Training:    [] (89794) Provided training and instruction to the patient for proper LE, core and proximal hip recruitment and positioning and eccentric body weight control with ambulation re-education including up and down stairs     Manual Treatments:    [x] (91562) Provided manual therapy to mobilize soft tissue/joints for the purpose of modulating pain, promoting relaxation,  increasing ROM, reducing/eliminating soft tissue swelling/inflammation/restriction, improving soft tissue extensibility and allowing for proper ROM for normal function with self-care, mobility, lifting and ambulation.  Per list x 13 min    Home Exercise Program:    [] (87054) Reviewed/Progressed HEP activities related to strengthening, flexibility, endurance, ROM of core, proximal hip, pelvic floor, and LE for functional self-care, mobility, lifting and ambulation/stair navigation   [] (86088) Reviewed/Progressed HEP activities related to improving balance, coordination, kinesthetic sense, posture, motor skill, proprioception of core, proximal hip pelvic floor, and LE for self-care, mobility, lifting, and ambulation/stair navigation      Modalities:    [] Electric Stimulation:   [] Ultrasound:   [] Other:       Charges:  Timed Code Treatment Minutes: TE x 17, TA x 15, MT x 13   Total Treatment Minutes: 45 min      [] EVAL (LOW) 89143 (typically 20 minutes face-to-face)  [] EVAL (MOD) 46512 (typically 30 minutes face-to-face)  [] EVAL (HIGH) 75233 (typically 45 minutes face-to-face)  [] RE-EVAL     [x] OS(92314) x 1      [] NMR (09865) x       [x] Manual (50988) x  1     [x] TA (39597) x  1     [] Gait Training [phone] x       [] ES(attended) (55346)  [] ES (un) (90773)   [] DRY NEEDLE 1 OR 2 MUSCLES  [] DRY NEEDLE 3+ MUSCLES  [] Mech Traction (18407)  [] Ultrasound (66025)  [] Other:      GOALS:  Patient stated goal: \"able to hold urine\" without leaking  [] Progressing: [] Met: [] Not Met: [] Adjusted        Therapist goals for Patient:   Short Term Goals: To be achieved in: 6 weeks  1. Pt will report improvement in urinary urgency by 75%   [] Progressing: [] Met: [] Not Met: [] Adjusted  2. Patient will report decreased JIC voids by 80% and verbalize 4/4 urge suppression/bladder retraining techniques   [] Progressing: [] Met: [] Not Met: [] Adjusted     Long Term Goals: To be achieved in: 12 weeks  1. Disability index score of 5% or less for the PFDI-20 to assist with reaching prior level of function. [] Progressing: [] Met: [] Not Met: [] Adjusted  2. Patient will demonstrate good core stability and pelvic bracing with exertion to prevent daily urinary leakage   [] Progressing: [] Met: [] Not Met: [] Adjusted  3. Patient will increase PERF score to 4/10/10/10 to demonstrate increased strength and coordination over pelvic floor musculature. [] Progressing: [] Met: [] Not Met: [] Adjusted  4.  Patient will report 1 week or greater without leakage to progress towards completing ADLs and recreational activities without leakage. [] Progressing: [] Met: [] Not Met: [] Adjusted  5. Patient will demonstrate good load transfer,breath control, and body mechanics with exertion/lifting to prevent leakage  [] Progressing: [] Met: [] Not Met: [] Adjusted  6. Pt will resume recreational activities without leakage    [] Progressing: [] Met: [] Not Met: [] Adjusted         ASSESSMENT:  46yo female pt referred to PFPT due to urinary urgency and stress incontinence. Pt  tolerated therapy well with report of no back pain following treatment. Bladder diary indicated normal void frequency and duration, no leaks and decent water intake. Recommended increase fiber intake to 25 mg/day . Urgency/SI related to pressure management vs dietary, and pt did not record JIC voids as originally reported in hx. This patient will benefit from continued skilled PT to restore daily activity and exertion without urinary leakage. Treatment/Activity Tolerance:  [x] Patient tolerated treatment well [] Patient limited by fatique  [] Patient limited by pain  [] Patient limited by other medical complications  [] Other:     Overall Progression Towards Functional goals/ Treatment Progress Update:  [x] Patient is progressing as expected towards functional goals listed. [] Progression is slowed due to complexities/Impairments listed. [] Progression has been slowed due to co-morbidities.   [] Plan just implemented, too soon to assess goals progression <30days   [] Goals require adjustment due to lack of progress  [] Patient is not progressing as expected and requires additional follow up with physician  [] Other    Prognosis for POC: [x] Good [] Fair  [] Poor    Patient requires continued skilled intervention: [x] Yes  [] No        PLAN: See eval  [x] Continue per plan of care [] Alter current plan (see comments)  [] Plan of care initiated [] Hold pending MD visit [] Discharge    Electronically signed by: Prosper Page PT    Note: If patient does not return for scheduled/recommended follow up visits, this note will serve as a discharge from care along with the most recent update on progress.

## 2023-03-06 ENCOUNTER — HOSPITAL ENCOUNTER (OUTPATIENT)
Dept: PHYSICAL THERAPY | Age: 38
Setting detail: THERAPIES SERIES
Discharge: HOME | End: 2023-03-06
Payer: COMMERCIAL

## 2023-03-06 PROCEDURE — 97140 MANUAL THERAPY 1/> REGIONS: CPT

## 2023-03-06 PROCEDURE — 97110 THERAPEUTIC EXERCISES: CPT

## 2023-03-06 NOTE — FLOWSHEET NOTE
[facility] Outpatient Therapy  [address] [place] [address], [address] [place] [address]  Phone: [phone]   Fax: [phone]    Physical Therapy Treatment Note/ Progress Report:     Date:  2023     Patient Name:  Lilia Vital    :  1985  MRN: [id number]    Medical Diagnosis Information:  Urinary urgency [R39.15]  Treatment Diagnosis Information:  Treatment Diagnosis: R39.15 Urinary urgency, N39.3 stress incontinence  Insurance/Certification information:  PT Insurance Information: [facility]  Physician Information:  Hansa Rivas, *   Plan of care signed:    [x] Yes  [] No    Date of Patient follow up with Physician:      Progress Report: []  Yes  [x]  No   If Yes, Date Range for reporting period:  Beginnin23  Ending:      Progress report due (10 Rx/or 30 days whichever is less): 3/8/23     Recertification due (POC duration/ or 90 days whichever is less):      Visit # Insurance Allowable Auth Needed     []Yes   []No     RESTRICTIONS/PRECAUTIONS: [facility]  Latex Allergy:  [x]NO      []YES  Preferred Language for Healthcare:   [x]English       []other:    Functional Scale: PFDI-20   Score: 34/300 (11%)  Date assessed: 23    Pain level:  0/10 low back     SUBJECTIVE: no leakage since last seen, urgency is improving. Pt has been doing good getting PF exercises in when driving but has struggled a bit with other strengthening exercises since last seen.    OBJECTIVE: See eval  Observation: scoliosis :R hip high/R shoulder low  Test measurements:  RSB decreased 25%,  Mild palpable tenderness R OI and LA  L hip strength 4- except rotators 3+  No palpable contraction L OI  PERFECT: /6/, decreased coordination,timing and minimal co-contraction of TrA    Exercises/Interventions: Exercises in bold performed in department today.  Items not bolded are carried forward from prior visits for continuity of the record.    Exercise/Equipment Resistance/Repetitions HEP Other  comments   ITB,piriformis,hamstring stretch R LE manual []    R long leg pulls 0/45,B long leg distraction manual []    STM R ITB side lying manual []    R quadratus stretch  manual []    \"The knack\" anticipatory PF contraction w/cough X 5 []    Bridge + PF on ball 2 x10 []    Quadruped TrA 10 sec x10 []    Abdominal crunch 3x10 []    obliques 3x10 []    Standing calf stretch 30 sec x 3 []    Clamshells + TrA Green tband x 30 []    SL abduction + TrA x30 []      []    PF short holds 6-8 sets x 10 []    PF  long holds  7 sec holds x 7 [] Cues for breathing   PF + add x10 []    PF + abd Green tband x 10 []    Standing ITB stretch 30 sec x 3 []      Home Exercise Program:  Access Code: [email or web address]  URL: [email or web address]  Date: 03/01/2023  Prepared by: Camila Edwards    Exercises  Standing ITB Stretch - 1 x daily - 7 x weekly - 1 sets - 3 reps - 30 hold  Seated Pelvic Floor Contraction - 1 x daily - 7 x weekly - 6-8 sets - 10 reps - 1 hold  Seated Pelvic Floor Contraction - 1 x daily - 7 x weekly - 1 sets - 10 reps - 7 hold  Seated Pelvic Floor Contraction with Hip Abduction and Resistance Loop - 1 x daily - 7 x weekly - 3 sets - 10 reps  Seated Pelvic Floor Contraction with Isometric Hip Adduction - 1 x daily - 7 x weekly - 3 sets - 10 reps  Seated Cough with Pelvic Floor Contraction and Hand to Mouth - 1 x daily - 7 x weekly - 1 sets - 5 reps  Supine Bridge with Pelvic Floor Contraction - 1 x daily - 7 x weekly - 3 sets - 10 reps  Clamshell with Resistance - 1 x daily - 7 x weekly - 3 sets - 10 reps  Sidelying Hip Abduction - 1 x daily - 7 x weekly - 3 sets - 10 reps      Therapeutic Exercise:   [x] (10545) Provided verbal/tactile cueing for activities related to strengthening, flexibility, endurance, ROM for improvements in LE, proximal hip, pelvic floor, and core control with self-care, mobility, lifting, ambulation.  Per list x 27 min    NMR:  [] (85925) Provided verbal/tactile cueing for activities related to improving balance, coordination, kinesthetic sense, posture, motor skill, proprioception to assist with LE, proximal hip, pelvic floor, and core control in self-care, mobility, lifting, ambulation and eccentric single leg control. Therapeutic Activities:    [] (73143) Provided verbal/tactile cueing for activities related to improving balance, coordination, kinesthetic sense, posture, motor skill, proprioception and motor activation to allow for proper function of core, proximal hip, pelvic floor, and LE with self-care and ADLs and functional mobility. Other Treatment:    TA:  Bladder re-training/education:    Bladder Diary: patient educated on importance of filling out bladder diary at home, complete with fluid intake, voids, and leakage when applicable. Voiding: patient voiding 7x/day, 2-4 hour duration, no leaks, \"normal\" urge, 1 JIC void, 1 night time void    Dietary: fluid intake 56oz- 90oz per day with half being water, low fiber intake with eduction provided on fiber,constipation, and relationship to PF and prolapse    Other: bowel noted to be 2-3 on [place] scale, instructed in behavioral strategies: quick flicks and perineal pressure/sitting to decrease urgency    Gait Training:    [] (20169) Provided training and instruction to the patient for proper LE, core and proximal hip recruitment and positioning and eccentric body weight control with ambulation re-education including up and down stairs     Manual Treatments:    [x] (74154) Provided manual therapy to mobilize soft tissue/joints for the purpose of modulating pain, promoting relaxation,  increasing ROM, reducing/eliminating soft tissue swelling/inflammation/restriction, improving soft tissue extensibility and allowing for proper ROM for normal function with self-care, mobility, lifting and ambulation.  Per list x 15 min    Home Exercise Program:    [] (55420) Reviewed/Progressed HEP activities related to strengthening, flexibility, endurance, ROM of core, proximal hip, pelvic floor, and LE for functional self-care, mobility, lifting and ambulation/stair navigation   [] (06155) Reviewed/Progressed HEP activities related to improving balance, coordination, kinesthetic sense, posture, motor skill, proprioception of core, proximal hip pelvic floor, and LE for self-care, mobility, lifting, and ambulation/stair navigation      Modalities:    [] Electric Stimulation:   [] Ultrasound:   [] Other:       Charges:  Timed Code Treatment Minutes: TE x 27,MT x 15   Total Treatment Minutes: 42 min      [] EVAL (LOW) 45040 (typically 20 minutes face-to-face)  [] EVAL (MOD) 20715 (typically 30 minutes face-to-face)  [] EVAL (HIGH) 07658 (typically 45 minutes face-to-face)  [] RE-EVAL     [x] TY(41209) x 2      [] NMR (02584) x       [x] Manual (78060) x  1     [] TA (55123) x      [] Gait Training [phone]) x       [] ES(attended) (93751)  [] ES (un) ([phone] [phone])   [] DRY NEEDLE 1 OR 2 MUSCLES  [] DRY NEEDLE 3+ MUSCLES  [] Mech Traction (85726)  [] Ultrasound (57651)  [] Other:      GOALS:  Patient stated goal: \"able to hold urine\" without leaking  [] Progressing: [] Met: [] Not Met: [] Adjusted        Therapist goals for Patient:   Short Term Goals: To be achieved in: 6 weeks  1. Pt will report improvement in urinary urgency by 75%   [] Progressing: [] Met: [] Not Met: [] Adjusted  2. Patient will report decreased JIC voids by 80% and verbalize 4/4 urge suppression/bladder retraining techniques   [] Progressing: [] Met: [] Not Met: [] Adjusted     Long Term Goals: To be achieved in: 12 weeks  1. Disability index score of 5% or less for the PFDI-20 to assist with reaching prior level of function. [] Progressing: [] Met: [] Not Met: [] Adjusted  2. Patient will demonstrate good core stability and pelvic bracing with exertion to prevent daily urinary leakage   [] Progressing: [] Met: [] Not Met: [] Adjusted  3.  Patient will increase PERF score to 4/10/10/10 to demonstrate increased strength and coordination over pelvic floor musculature. [] Progressing: [] Met: [] Not Met: [] Adjusted  4. Patient will report 1 week or greater without leakage to progress towards completing ADLs and recreational activities without leakage. [] Progressing: [] Met: [] Not Met: [] Adjusted  5. Patient will demonstrate good load transfer,breath control, and body mechanics with exertion/lifting to prevent leakage  [] Progressing: [] Met: [] Not Met: [] Adjusted  6. Pt will resume recreational activities without leakage    [] Progressing: [] Met: [] Not Met: [] Adjusted         ASSESSMENT:  46yo female pt referred to PFPT due to urinary urgency and stress incontinence. Pt demonstrated good pressure management with practicing anticipatory pre-contraction of  PF with a strong cough without leakage reported. Pt with palpable tenderness and tightness in R ITB which improved with STM/passive stretching. Pt is progressing well with proximal hip and core strength but hip extension on ball is still moderately challenging with focus on hip extension strengthening. . This patient will benefit from continued skilled PT to restore daily activity and exertion without urinary leakage. Treatment/Activity Tolerance:  [x] Patient tolerated treatment well [] Patient limited by fatique  [] Patient limited by pain  [] Patient limited by other medical complications  [] Other:     Overall Progression Towards Functional goals/ Treatment Progress Update:  [x] Patient is progressing as expected towards functional goals listed. [] Progression is slowed due to complexities/Impairments listed. [] Progression has been slowed due to co-morbidities.   [] Plan just implemented, too soon to assess goals progression <30days   [] Goals require adjustment due to lack of progress  [] Patient is not progressing as expected and requires additional follow up with physician  [] Other    Prognosis for POC: [x] Good [] Fair  [] Poor    Patient requires continued skilled intervention: [x] Yes  [] No        PLAN: See eval  [x] Continue per plan of care [] Alter current plan (see comments)  [] Plan of care initiated [] Hold pending MD visit [] Discharge    Electronically signed by: Georgia King, PT    Note: If patient does not return for scheduled/recommended follow up visits, this note will serve as a discharge from care along with the most recent update on progress.

## 2023-03-13 ENCOUNTER — HOSPITAL ENCOUNTER (OUTPATIENT)
Dept: PHYSICAL THERAPY | Age: 38
Setting detail: THERAPIES SERIES
Discharge: HOME | End: 2023-03-13
Payer: COMMERCIAL

## 2023-03-13 PROCEDURE — 97140 MANUAL THERAPY 1/> REGIONS: CPT

## 2023-03-13 PROCEDURE — 97112 NEUROMUSCULAR REEDUCATION: CPT

## 2023-03-13 NOTE — PROGRESS NOTES
[facility]  [address]  [facility] [place] [address], [address] [place] [address]  Phone: [phone]   Fax: [phone]    Physical Therapy Treatment Note/ Progress Report:     Date:  2023     Patient Name:  Kris Gabriel    :  1985  MRN: [id number]    Medical Diagnosis Information:  Urinary urgency [R39.15]  Treatment Diagnosis Information:  Treatment Diagnosis: R39.15 Urinary urgency, N39.3 stress incontinence  Insurance/Certification information:  PT Insurance Information: [facility]  Physician Information:  Myra Gomez, *   Plan of care signed:    [x] Yes  [] No    Date of Patient follow up with Physician:      Progress Report: [x]  Yes  []  No   If Yes, Date Range for reporting period:  Beginnin23  Ending:  3/13/23 (5 visits)    Progress report due (10 Rx/or 30 days whichever is less): 75     Recertification due (POC duration/ or 90 days whichever is less):      Visit # Insurance Allowable Auth Needed     []Yes   []No     RESTRICTIONS/PRECAUTIONS: [place]  Latex Allergy:  [x]NO      []YES  Preferred Language for Healthcare:   [x]English       []other:    Functional Scale: 23 PFDI-20 34/300  Functional Scale: 3/13/23 PFDI-20: 30/300    Pain level:  denies pain     SUBJECTIVE: denies recent urgency,  did have mild leakage with hard sneeze yesterday, but sneezed several times this morning without leakage, reports good compliance with HEP     OBJECTIVE: See eval  Observation: breath holding with exertion  Test measurements:  R lumbar side bending was 25% limited and is now WNL/symmetrical   Initially w/mild palpable tenderness R OI and LA, currently no palpable tenderness  L hip strength 4- , hip rotator strength increased to 4-/5  Initially no palpable contraction L OI, demonstrated good contraction today  PERFECT: initially //, current PF strength 2+, endurance 7sec, quick flicks 9, good relaxation  HS -30,      Exercises/Interventions: Exercises in bold performed in department today. Items not bolded are carried forward from prior visits for continuity of the record. Exercise/Equipment Resistance/Repetitions HEP Other comments   ITB,piriformis,hamstring stretch R LE manual []    R long leg pulls 0/45,B long leg distraction manual []    STM R ITB side lying manual []    R quadratus stretch  manual []    \"The knack\" anticipatory PF contraction w/cough X 5 []    Bridge + PF on ball 2 x10 []    Quadruped TrA 10 sec x10 []    Abdominal crunch 3x10 []    obliques 3x10 []    Standing calf stretch 30 sec x 3 []    Clamshells + TrA Green tband x 30 []    SL abduction + TrA x30 []    PERFECT 2+ strength, 7 sec, 9 quick flicks []    NMR w/Biofeedback Relaxation 1-2Hz, contraction 8-12Hz, 10 sec holds 6Hz. Diaphragmatic breathing     PF short holds 6-8 sets x 10 []    PF  long holds  7 sec holds x 7 [] Cues for breathing   PF + add x10 []    PF + abd Green tband x 10 []    Standing ITB stretch 30 sec x 3 []      Home Exercise Program:  Access Code: [email or web address]  URL: [email or web address]  Date: 03/01/2023  Prepared by: Verónica Pagan    Exercises  Standing ITB Stretch - 1 x daily - 7 x weekly - 1 sets - 3 reps - 30 hold  Seated Pelvic Floor Contraction - 1 x daily - 7 x weekly - 6-8 sets - 10 reps - 1 hold  Seated Pelvic Floor Contraction - 1 x daily - 7 x weekly - 1 sets - 10 reps - 7 hold  Seated Pelvic Floor Contraction with Hip Abduction and Resistance Loop - 1 x daily - 7 x weekly - 3 sets - 10 reps  Seated Pelvic Floor Contraction with Isometric Hip Adduction - 1 x daily - 7 x weekly - 3 sets - 10 reps  Seated Cough with Pelvic Floor Contraction and Hand to Mouth - 1 x daily - 7 x weekly - 1 sets - 5 reps  Supine Bridge with Pelvic Floor Contraction - 1 x daily - 7 x weekly - 3 sets - 10 reps  Clamshell with Resistance - 1 x daily - 7 x weekly - 3 sets - 10 reps  Sidelying Hip Abduction - 1 x daily - 7 x weekly - 3 sets - 10 reps      Therapeutic Exercise:   [] (56951) Provided verbal/tactile cueing for activities related to strengthening, flexibility, endurance, ROM for improvements in LE, proximal hip, pelvic floor, and core control with self-care, mobility, lifting, ambulation. Per list x 27 min    NMR:  [x] (35398) Provided verbal/tactile cueing for activities related to improving balance, coordination, kinesthetic sense, posture, motor skill, proprioception to assist with LE, proximal hip, pelvic floor, and core control in self-care, mobility, lifting, ambulation and eccentric single leg control. Per list x 24 min    Therapeutic Activities:    [] (10645) Provided verbal/tactile cueing for activities related to improving balance, coordination, kinesthetic sense, posture, motor skill, proprioception and motor activation to allow for proper function of core, proximal hip, pelvic floor, and LE with self-care and ADLs and functional mobility. Other Treatment:    TA:  Bladder re-training/education:    Bladder Diary: patient educated on importance of filling out bladder diary at home, complete with fluid intake, voids, and leakage when applicable.     Voiding: patient voiding 7x/day, 2-4 hour duration, no leaks, \"normal\" urge, 1 JIC void, 1 night time void    Dietary: fluid intake 56oz- 90oz per day with half being water, low fiber intake with eduction provided on fiber,constipation, and relationship to PF and prolapse    Other: bowel noted to be 2-3 on [place] scale, instructed in behavioral strategies: quick flicks and perineal pressure/sitting to decrease urgency    Gait Training:    [] (89036) Provided training and instruction to the patient for proper LE, core and proximal hip recruitment and positioning and eccentric body weight control with ambulation re-education including up and down stairs     Manual Treatments:    [x] (81237) Provided manual therapy to mobilize soft tissue/joints for the purpose of modulating pain, promoting relaxation,  increasing ROM, reducing/eliminating soft tissue swelling/inflammation/restriction, improving soft tissue extensibility and allowing for proper ROM for normal function with self-care, mobility, lifting and ambulation. Per list x 15 min    Home Exercise Program:    [x] (57470) Reviewed/Progressed HEP activities related to strengthening, flexibility, endurance, ROM of core, proximal hip, pelvic floor, and LE for functional self-care, mobility, lifting and ambulation/stair navigation   [] (94887) Reviewed/Progressed HEP activities related to improving balance, coordination, kinesthetic sense, posture, motor skill, proprioception of core, proximal hip pelvic floor, and LE for self-care, mobility, lifting, and ambulation/stair navigation      Modalities:    [] Electric Stimulation:   [] Ultrasound:   [] Other:       Charges:  Timed Code Treatment Minutes: NMR x 24,MT x 15   Total Treatment Minutes: 39 min      [] EVAL (LOW) 32381 (typically 20 minutes face-to-face)  [] EVAL (MOD) 58319 (typically 30 minutes face-to-face)  [] EVAL (HIGH) 77725 (typically 45 minutes face-to-face)  [] RE-EVAL     [] UB(33621) x       [x] NMR (65490) x  2     [x] Manual (92810) x  1     [] TA (27029) x      [] Gait Training [phone]) x       [] ES(attended) (13978)  [] ES (un) ([phone])   [] DRY NEEDLE 1 OR 2 MUSCLES  [] DRY NEEDLE 3+ MUSCLES  [] Mech Traction (87188)  [] Ultrasound (12949)  [] Other:      GOALS:  Patient stated goal: \"able to hold urine\" without leaking  [x] Progressing: [] Met: [] Not Met: [] Adjusted        Therapist goals for Patient:   Short Term Goals: To be achieved in: 6 weeks  1. Pt will report improvement in urinary urgency by 75%   [] Progressing: [x] Met: [] Not Met: [] Adjusted  2. Patient will report decreased JIC voids by 80% and verbalize 4/4 urge suppression/bladder retraining techniques   [x] Progressing: [] Met: [] Not Met: [] Adjusted     Long Term Goals: To be achieved in: 12 weeks  1. Disability index score of 5% or less for the PFDI-20 to assist with reaching prior level of function.   [x] Progressing: [] Met: [] Not Met: [] Adjusted  2. Patient will demonstrate good core stability and pelvic bracing with exertion to prevent daily urinary leakage   [] Progressing: [x] Met: [] Not Met: [] Adjusted  3. Patient will increase PERF score to 4/10/10/10 to demonstrate increased strength and coordination over pelvic floor musculature.    [x] Progressing: [] Met: [] Not Met: [] Adjusted  4. Patient will report 1 week or greater without leakage to progress towards completing ADLs and recreational activities without leakage.  [] Progressing: [x] Met: [] Not Met: [] Adjusted  5. Patient will demonstrate good load transfer,breath control, and body mechanics with exertion/lifting to prevent leakage  [x] Progressing: [] Met: [] Not Met: [] Adjusted  6. Pt will resume recreational activities without leakage    [x] Progressing: [] Met: [] Not Met: [] Adjusted         ASSESSMENT:  36yo female pt referred to PFPT due to urinary urgency and stress incontinence.  Pt has been seen 5 visits with steady progress being made. Pt no longer reports weekly leakage, and reports 50% improvement with stress incontinence. Pt no longer c/o palpable tenderness over L LA/OI and demonstrates good contraction of R OI. PF strength is slightly improved and continues to be stronger in urogenital triangle with decreased lift in LA. Hip strength is improved but pt continues with moderate tightness in hamstrings and ITB. Pt verbalizes 3/4 behavioral strategies for urinary urgency with overall improvement reported. Pt tolerated NMR with use of biofeedback with good relaxation and improvement in max contraction and endurance throughout visit. Pt is ready to progress to start progressing strength with higher level activity and functional movements. This patient will benefit from continued skilled PT to progress strength, pressure management  and behavioral strategies in order to resume functional mobility and recreational activity without leakage. Treatment/Activity Tolerance:  [x] Patient tolerated treatment well [] Patient limited by fatique  [] Patient limited by pain  [] Patient limited by other medical complications  [] Other:     Overall Progression Towards Functional goals/ Treatment Progress Update:  [x] Patient is progressing as expected towards functional goals listed. [] Progression is slowed due to complexities/Impairments listed. [] Progression has been slowed due to co-morbidities. [] Plan just implemented, too soon to assess goals progression <30days   [] Goals require adjustment due to lack of progress  [] Patient is not progressing as expected and requires additional follow up with physician  [] Other    Prognosis for POC: [x] Good [] Fair  [] Poor    Patient requires continued skilled intervention: [x] Yes  [] No        PLAN: See eval  [x] Continue per plan of care [] Alter current plan (see comments)  [] Plan of care initiated [] Hold pending MD visit [] Discharge    Electronically signed by: Doris Song PT    Note: If patient does not return for scheduled/recommended follow up visits, this note will serve as a discharge from care along with the most recent update on progress.

## 2023-03-20 ENCOUNTER — HOSPITAL ENCOUNTER (OUTPATIENT)
Dept: PHYSICAL THERAPY | Age: 38
Setting detail: THERAPIES SERIES
Discharge: HOME | End: 2023-03-20
Payer: COMMERCIAL

## 2023-03-20 NOTE — CARE COORDINATION
[facility] Outpatient Therapy  [address], [address]  Phone: [phone]   Fax: [phone]    Physical Therapy Missed Visit Note     Date:  3/20/2023    Patient Name:  Anabela Castillo      :  1985    MRN: [id number]      Cancelled visits to date: 0  No-shows to date: 0    For today's appointment patient:  [x]  Cancelled  []  Rescheduled appointment  []  No-show     Reason given by patient:  [x]  Patient ill  []  Conflicting appointment  []  No transportation    []  Conflict with work  []  No reason given  []  Other:     Comments:      Electronically signed by:  Julia Velasquez PT

## 2023-03-24 ENCOUNTER — TELEMEDICINE (OUTPATIENT)
Dept: PRIMARY CARE CLINIC | Age: 38
End: 2023-03-24
Payer: COMMERCIAL

## 2023-03-24 DIAGNOSIS — J01.11 ACUTE RECURRENT FRONTAL SINUSITIS: ICD-10-CM

## 2023-03-24 DIAGNOSIS — R05.1 ACUTE COUGH: Primary | ICD-10-CM

## 2023-03-24 PROCEDURE — 99213 OFFICE O/P EST LOW 20 MIN: CPT | Performed by: NURSE PRACTITIONER

## 2023-03-24 PROCEDURE — G8427 DOCREV CUR MEDS BY ELIG CLIN: HCPCS | Performed by: NURSE PRACTITIONER

## 2023-03-24 RX ORDER — CEFUROXIME AXETIL 500 MG/1
500 TABLET ORAL 2 TIMES DAILY
Qty: 14 TABLET | Refills: 0 | Status: SHIPPED | OUTPATIENT
Start: 2023-03-24 | End: 2023-03-31

## 2023-03-24 RX ORDER — BENZONATATE 100 MG/1
100-200 CAPSULE ORAL 3 TIMES DAILY PRN
Qty: 60 CAPSULE | Refills: 0 | Status: SHIPPED | OUTPATIENT
Start: 2023-03-24 | End: 2023-03-31

## 2023-03-24 SDOH — ECONOMIC STABILITY: HOUSING INSECURITY
IN THE LAST 12 MONTHS, WAS THERE A TIME WHEN YOU DID NOT HAVE A STEADY PLACE TO SLEEP OR SLEPT IN A SHELTER (INCLUDING NOW)?: NO

## 2023-03-24 SDOH — ECONOMIC STABILITY: TRANSPORTATION INSECURITY
IN THE PAST 12 MONTHS, HAS LACK OF TRANSPORTATION KEPT YOU FROM MEETINGS, WORK, OR FROM GETTING THINGS NEEDED FOR DAILY LIVING?: NO

## 2023-03-24 SDOH — ECONOMIC STABILITY: INCOME INSECURITY: HOW HARD IS IT FOR YOU TO PAY FOR THE VERY BASICS LIKE FOOD, HOUSING, MEDICAL CARE, AND HEATING?: NOT HARD AT ALL

## 2023-03-24 SDOH — ECONOMIC STABILITY: FOOD INSECURITY: WITHIN THE PAST 12 MONTHS, THE FOOD YOU BOUGHT JUST DIDN'T LAST AND YOU DIDN'T HAVE MONEY TO GET MORE.: NEVER TRUE

## 2023-03-24 SDOH — ECONOMIC STABILITY: FOOD INSECURITY: WITHIN THE PAST 12 MONTHS, YOU WORRIED THAT YOUR FOOD WOULD RUN OUT BEFORE YOU GOT MONEY TO BUY MORE.: NEVER TRUE

## 2023-03-24 ASSESSMENT — ENCOUNTER SYMPTOMS
SHORTNESS OF BREATH: 0
COUGH: 1
SINUS PAIN: 1
WHEEZING: 0
SINUS PRESSURE: 1

## 2023-03-24 NOTE — PROGRESS NOTES
neck        [] Abnormal-       Neurological:        [x] No Facial Asymmetry (Cranial nerve 7 motor function) (limited exam to video visit)          [] No gaze palsy        [] Abnormal-         Skin:        [x] No significant exanthematous lesions or discoloration noted on facial skin         [] Abnormal-            Psychiatric:       [x] Normal Affect [x] No Hallucinations        [] Abnormal-     Other pertinent observable physical exam findings-     ASSESSMENT/PLAN:  1. Acute cough  Suspect patient has a postviral cough. We will treat with Tessalon Perles  - benzonatate (TESSALON) 100 MG capsule; Take 1-2 capsules by mouth 3 times daily as needed for Cough  Dispense: 60 capsule; Refill: 0    2. Acute recurrent frontal sinusitis  Educated patient about the difference between a viral versus bacterial infection. If patient has worsen symptoms over the weekend since she is feeling better today she will start antibiotics patient agrees. - benzonatate (TESSALON) 100 MG capsule; Take 1-2 capsules by mouth 3 times daily as needed for Cough  Dispense: 60 capsule; Refill: 0  - cefUROXime (CEFTIN) 500 MG tablet; Take 1 tablet by mouth 2 times daily for 7 days  Dispense: 14 tablet; Refill: 0    No follow-ups on file. Alexei Krause is a 40 y.o. female being evaluated by a Virtual Visit (video visit) encounter to address concerns as mentioned above. A caregiver was present when appropriate. Due to this being a TeleHealth encounter (During OJBOG-85 public health emergency), evaluation of the following organ systems was limited: Vitals/Constitutional/EENT/Resp/CV/GI//MS/Neuro/Skin/Heme-Lymph-Imm.   Pursuant to the emergency declaration under the [facility], [address] [place] [address] authority and the [employer] and [employer] Act, this Virtual Visit was conducted with patient's (and/or legal guardian's) consent, to reduce the patient's risk of exposure to

## 2023-03-27 ENCOUNTER — HOSPITAL ENCOUNTER (OUTPATIENT)
Dept: PHYSICAL THERAPY | Age: 38
Setting detail: THERAPIES SERIES
Discharge: HOME | End: 2023-03-27
Payer: COMMERCIAL

## 2023-03-27 PROCEDURE — 97110 THERAPEUTIC EXERCISES: CPT

## 2023-03-27 NOTE — FLOWSHEET NOTE
[facility], [facility]  [address]  [facility] [place] [address], [address] [place] [address]  Phone: [phone]   Fax: [phone]    Physical Therapy Treatment Note/ Progress Report:     Date:  2023     Patient Name:  Jacob Nowak    :  1985  MRN: [id number]    Medical Diagnosis Information:  Urinary urgency [R39.15]  Treatment Diagnosis Information:  Treatment Diagnosis: R39.15 Urinary urgency, N39.3 stress incontinence  Insurance/Certification information:  PT Insurance Information: [facility]  Physician Information:  David Hiceky, *   Plan of care signed:    [x] Yes  [] No    Date of Patient follow up with Physician:      Progress Report: [x]  Yes  []  No   If Yes, Date Range for reporting period:  Beginnin23  Ending:  3/13/23 (5 visits)    Progress report due (10 Rx/or 30 days whichever is less): 61     Recertification due (POC duration/ or 90 days whichever is less):      Visit # Insurance Allowable Auth Needed     []Yes   []No     RESTRICTIONS/PRECAUTIONS: [place]  Latex Allergy:  [x]NO      []YES  Preferred Language for Healthcare:   [x]English       []other:    Functional Scale: 23 PFDI-20 34/300  Functional Scale: 3/13/23 PFDI-20: 30/300    Pain level:  denies pain     SUBJECTIVE: Pt recovering from upper respiratory infection with significant cough over the past 2 weeks,  reports some leakage with hard cough but stated  \" it was better than I thought or expected\", continued improvements being made with urgency and incontinence, denies any pain    OBJECTIVE: See eval  Observation: breath holding with exertion  Test measurements:  R lumbar side bending was 25% limited and is now WNL/symmetrical   Initially w/mild palpable tenderness R OI and LA, currently no palpable tenderness  L hip strength 4- , hip rotator strength increased to 4-/5  Initially no palpable contraction L OI, demonstrated good contraction today  PERFECT: initially /, current PF

## 2023-04-03 ENCOUNTER — HOSPITAL ENCOUNTER (OUTPATIENT)
Dept: PHYSICAL THERAPY | Age: 38
Setting detail: THERAPIES SERIES
Discharge: HOME | End: 2023-04-03
Payer: COMMERCIAL

## 2023-04-03 PROCEDURE — 97110 THERAPEUTIC EXERCISES: CPT

## 2023-04-03 NOTE — FLOWSHEET NOTE
carried forward from prior visits for continuity of the record.    Exercise/Equipment Resistance/Repetitions HEP Other comments   ITB,piriformis,hamstring stretch R LE manual []    Deep squat + PF x3 []    Forward bends + breathing+ PF 10 []    STS + PF 2x15 [] Burns in hamstrings   \"The knack\" anticipatory PF contraction w/cough X 5 []    Bridge + PF on ball 3 x 10 []    Quadruped TrA + alt arm/leg lift 10 sec x10 []    Abdominal UE/LE ball pass 3x10 []    Inclines oblique twist 2x15 []    Standing calf stretch 30 sec x 3 []    Clamshells + TrA Green tband x 30 []    Standing 3 way kick + TrA Yellow tband X 30 []    Seated diaphragmatic breathing cues []    Biofeedback/NMR Relaxation 1-2Hz, contraction 8-12Hz, 10 sec holds 6Hz. Diaphragmatic breathing     PF short holds 3 sets x 10 []    PF  long holds  10 sec holds x 10 []    PF + add x10 []    PF + abd Green tband x 10 []       []    Access Code: [email or web address]  URL: [email or web address]  Date: 04/03/2023  Prepared by: Maria L Daniels    Exercises  - Standing ITB Stretch  - 1 x daily - 7 x weekly - 1 sets - 3 reps - 30 hold  - Seated Pelvic Floor Contraction  - 1 x daily - 7 x weekly - 6-8 sets - 10 reps - 1 hold  - Seated Pelvic Floor Contraction  - 1 x daily - 7 x weekly - 1 sets - 10 reps - 7 hold  - Seated Pelvic Floor Contraction with Hip Abduction and Resistance Loop  - 1 x daily - 7 x weekly - 3 sets - 10 reps  - Seated Pelvic Floor Contraction with Isometric Hip Adduction  - 1 x daily - 7 x weekly - 3 sets - 10 reps  - Seated Cough with Pelvic Floor Contraction and Hand to Mouth  - 1 x daily - 7 x weekly - 1 sets - 5 reps  - Quadruped Pelvic Floor Contraction with Opposite Arm and Leg Lift  - 1 x daily - 7 x weekly - 3 sets - 10 reps  - Sit to Stand with Pelvic Floor Contraction  - 1 x daily - 7 x weekly - 3 sets - 10 reps  - Single Leg Stance with 3-Way Kick on Foam  - 1 x daily - 7 x weekly - 3 sets - 10 reps  - Weighted Ab Twist  - 1 x daily - 7 x

## 2023-04-10 ENCOUNTER — APPOINTMENT (OUTPATIENT)
Dept: PHYSICAL THERAPY | Age: 38
End: 2023-04-10
Payer: COMMERCIAL

## 2023-04-17 NOTE — CARE COORDINATION
[facility] Outpatient Therapy  [address]  [address] [place] [address], [address], [address]  Phone: [phone]   Fax: [phone]    Physical Therapy Missed Visit Note     Date:  2023    Patient Name:  Rosita Montoya      :  1985    MRN: [id number]      Cancelled visits to date: 0  No-shows to date: 0    For today's appointment patient:  [x]  Cancelled  []  Rescheduled appointment  []  No-show     Reason given by patient:  []  Patient ill  []  Conflicting appointment  []  No transportation    []  Conflict with work  []  No reason given  []  Other:     Comments:  per provider,PT emergency, vm left in attempt to reschedule and offer other times but no return call received    Electronically signed by:  Ryne Ragland PT

## 2023-04-18 ENCOUNTER — HOSPITAL ENCOUNTER (OUTPATIENT)
Dept: PHYSICAL THERAPY | Age: 38
Setting detail: THERAPIES SERIES
Discharge: HOME | End: 2023-04-18
Payer: COMMERCIAL

## 2024-10-04 ASSESSMENT — PATIENT HEALTH QUESTIONNAIRE - PHQ9
SUM OF ALL RESPONSES TO PHQ9 QUESTIONS 1 & 2: 0
SUM OF ALL RESPONSES TO PHQ QUESTIONS 1-9: 0
2. FEELING DOWN, DEPRESSED OR HOPELESS: NOT AT ALL
1. LITTLE INTEREST OR PLEASURE IN DOING THINGS: NOT AT ALL
SUM OF ALL RESPONSES TO PHQ QUESTIONS 1-9: 0
SUM OF ALL RESPONSES TO PHQ QUESTIONS 1-9: 0
1. LITTLE INTEREST OR PLEASURE IN DOING THINGS: NOT AT ALL
SUM OF ALL RESPONSES TO PHQ QUESTIONS 1-9: 0
SUM OF ALL RESPONSES TO PHQ9 QUESTIONS 1 & 2: 0
2. FEELING DOWN, DEPRESSED OR HOPELESS: NOT AT ALL

## 2024-10-07 ENCOUNTER — OFFICE VISIT (OUTPATIENT)
Dept: PRIMARY CARE CLINIC | Age: 39
End: 2024-10-07

## 2024-10-07 VITALS
OXYGEN SATURATION: 98 % | HEIGHT: 66 IN | TEMPERATURE: 97.9 F | HEART RATE: 73 BPM | DIASTOLIC BLOOD PRESSURE: 70 MMHG | WEIGHT: 124.8 LBS | RESPIRATION RATE: 16 BRPM | SYSTOLIC BLOOD PRESSURE: 102 MMHG | BODY MASS INDEX: 20.06 KG/M2

## 2024-10-07 DIAGNOSIS — Z00.00 ENCOUNTER FOR PREVENTIVE CARE: Primary | ICD-10-CM

## 2024-10-07 SDOH — ECONOMIC STABILITY: FOOD INSECURITY: WITHIN THE PAST 12 MONTHS, YOU WORRIED THAT YOUR FOOD WOULD RUN OUT BEFORE YOU GOT MONEY TO BUY MORE.: NEVER TRUE

## 2024-10-07 SDOH — ECONOMIC STABILITY: FOOD INSECURITY: WITHIN THE PAST 12 MONTHS, THE FOOD YOU BOUGHT JUST DIDN'T LAST AND YOU DIDN'T HAVE MONEY TO GET MORE.: NEVER TRUE

## 2024-10-07 SDOH — ECONOMIC STABILITY: INCOME INSECURITY: HOW HARD IS IT FOR YOU TO PAY FOR THE VERY BASICS LIKE FOOD, HOUSING, MEDICAL CARE, AND HEATING?: NOT HARD AT ALL

## 2024-10-07 ASSESSMENT — ENCOUNTER SYMPTOMS
WHEEZING: 0
SHORTNESS OF BREATH: 0
COUGH: 0

## 2024-10-07 NOTE — PROGRESS NOTES
Mood and Affect: Mood normal.         Behavior: Behavior normal.         Thought Content: Thought content normal.         Judgment: Judgment normal.         Plan:   1. Encounter for preventive care patient is doing well.  Encourage patient to continue making healthy lifestyle changes.  Will follow-up if struggling with staying home.  -     LIPID PANEL; Future  -     Comprehensive Metabolic Panel; Future  -     Influenza, FLUCELVAX Trivalent, (age 6 mo+) IM, Preservative Free, 0.5mL             Electronically signed by ERICA Carrington CNP on 10/7/24 at 1:35 PM EDT     This dictation was generated by voice recognition computer software. Although all attempts are made to edit the dictation for accuracy, there may be errors in the transcription that were not intended.